# Patient Record
Sex: FEMALE | Race: ASIAN | URBAN - METROPOLITAN AREA
[De-identification: names, ages, dates, MRNs, and addresses within clinical notes are randomized per-mention and may not be internally consistent; named-entity substitution may affect disease eponyms.]

---

## 2018-04-30 RX ORDER — FLUTICASONE PROPIONATE 50 MCG
1 SPRAY, SUSPENSION (ML) NASAL DAILY
COMMUNITY

## 2018-05-02 ENCOUNTER — ANESTHESIA (OUTPATIENT)
Dept: SURGERY | Facility: CLINIC | Age: 58
End: 2018-05-02
Payer: COMMERCIAL

## 2018-05-02 ENCOUNTER — ANESTHESIA EVENT (OUTPATIENT)
Dept: SURGERY | Facility: CLINIC | Age: 58
End: 2018-05-02
Payer: COMMERCIAL

## 2018-05-02 ENCOUNTER — HOSPITAL ENCOUNTER (OUTPATIENT)
Facility: CLINIC | Age: 58
Discharge: HOME | End: 2018-05-02
Attending: OPHTHALMOLOGY | Admitting: OPHTHALMOLOGY
Payer: COMMERCIAL

## 2018-05-02 VITALS
WEIGHT: 109 LBS | OXYGEN SATURATION: 99 % | SYSTOLIC BLOOD PRESSURE: 130 MMHG | BODY MASS INDEX: 20.58 KG/M2 | RESPIRATION RATE: 20 BRPM | DIASTOLIC BLOOD PRESSURE: 80 MMHG | HEIGHT: 61 IN

## 2018-05-02 PROCEDURE — 25000125 ZZHC RX 250: Performed by: ANESTHESIOLOGY

## 2018-05-02 PROCEDURE — 36000105 ZZH EYE SURGERY LEVEL 4 EA 15 ADDTL MIN: Performed by: OPHTHALMOLOGY

## 2018-05-02 PROCEDURE — 27210794 ZZH OR GENERAL SUPPLY STERILE: Performed by: OPHTHALMOLOGY

## 2018-05-02 PROCEDURE — 40000170 ZZH STATISTIC PRE-PROCEDURE ASSESSMENT II: Performed by: OPHTHALMOLOGY

## 2018-05-02 PROCEDURE — 25000125 ZZHC RX 250: Performed by: OPHTHALMOLOGY

## 2018-05-02 PROCEDURE — 25000128 H RX IP 250 OP 636: Performed by: OPHTHALMOLOGY

## 2018-05-02 PROCEDURE — 25000132 ZZH RX MED GY IP 250 OP 250 PS 637: Performed by: OPHTHALMOLOGY

## 2018-05-02 PROCEDURE — 25000128 H RX IP 250 OP 636: Performed by: NURSE ANESTHETIST, CERTIFIED REGISTERED

## 2018-05-02 PROCEDURE — 37000008 ZZH ANESTHESIA TECHNICAL FEE, 1ST 30 MIN: Performed by: OPHTHALMOLOGY

## 2018-05-02 PROCEDURE — 36000104 ZZH EYE SURGERY LEVEL 4 1ST 30 MIN: Performed by: OPHTHALMOLOGY

## 2018-05-02 PROCEDURE — 37000009 ZZH ANESTHESIA TECHNICAL FEE, EACH ADDTL 15 MIN: Performed by: OPHTHALMOLOGY

## 2018-05-02 PROCEDURE — 27210995 ZZH RX 272: Performed by: OPHTHALMOLOGY

## 2018-05-02 PROCEDURE — 71000028 ZZH EYE RECOVERY PHASE 2 EACH 15 MINS: Performed by: OPHTHALMOLOGY

## 2018-05-02 PROCEDURE — 25000128 H RX IP 250 OP 636: Performed by: ANESTHESIOLOGY

## 2018-05-02 RX ORDER — SACCHAROMYCES BOULARDII 250 MG
250 CAPSULE ORAL 2 TIMES DAILY
COMMUNITY

## 2018-05-02 RX ORDER — CYCLOPENTOLATE HYDROCHLORIDE 10 MG/ML
1 SOLUTION/ DROPS OPHTHALMIC
Status: COMPLETED | OUTPATIENT
Start: 2018-05-02 | End: 2018-05-02

## 2018-05-02 RX ORDER — DEXAMETHASONE SODIUM PHOSPHATE 10 MG/ML
INJECTION, SOLUTION INTRAMUSCULAR; INTRAVENOUS PRN
Status: DISCONTINUED | OUTPATIENT
Start: 2018-05-02 | End: 2018-05-02 | Stop reason: HOSPADM

## 2018-05-02 RX ORDER — PHENYLEPHRINE HYDROCHLORIDE 25 MG/ML
1 SOLUTION/ DROPS OPHTHALMIC
Status: COMPLETED | OUTPATIENT
Start: 2018-05-02 | End: 2018-05-02

## 2018-05-02 RX ORDER — SODIUM CHLORIDE, SODIUM LACTATE, POTASSIUM CHLORIDE, CALCIUM CHLORIDE 600; 310; 30; 20 MG/100ML; MG/100ML; MG/100ML; MG/100ML
INJECTION, SOLUTION INTRAVENOUS CONTINUOUS
Status: DISCONTINUED | OUTPATIENT
Start: 2018-05-02 | End: 2018-05-02 | Stop reason: HOSPADM

## 2018-05-02 RX ORDER — PROPOFOL 10 MG/ML
INJECTION, EMULSION INTRAVENOUS PRN
Status: DISCONTINUED | OUTPATIENT
Start: 2018-05-02 | End: 2018-05-02

## 2018-05-02 RX ORDER — ATROPINE SULFATE 10 MG/ML
SOLUTION/ DROPS OPHTHALMIC PRN
Status: DISCONTINUED | OUTPATIENT
Start: 2018-05-02 | End: 2018-05-02 | Stop reason: HOSPADM

## 2018-05-02 RX ORDER — FENTANYL CITRATE 50 UG/ML
INJECTION, SOLUTION INTRAMUSCULAR; INTRAVENOUS PRN
Status: DISCONTINUED | OUTPATIENT
Start: 2018-05-02 | End: 2018-05-02

## 2018-05-02 RX ORDER — BALANCED SALT SOLUTION 6.4; .75; .48; .3; 3.9; 1.7 MG/ML; MG/ML; MG/ML; MG/ML; MG/ML; MG/ML
SOLUTION OPHTHALMIC PRN
Status: DISCONTINUED | OUTPATIENT
Start: 2018-05-02 | End: 2018-05-02 | Stop reason: HOSPADM

## 2018-05-02 RX ORDER — ONDANSETRON 2 MG/ML
INJECTION INTRAMUSCULAR; INTRAVENOUS PRN
Status: DISCONTINUED | OUTPATIENT
Start: 2018-05-02 | End: 2018-05-02

## 2018-05-02 RX ADMIN — FENTANYL CITRATE 50 MCG: 50 INJECTION, SOLUTION INTRAMUSCULAR; INTRAVENOUS at 10:49

## 2018-05-02 RX ADMIN — PROPOFOL 30 MG: 10 INJECTION, EMULSION INTRAVENOUS at 10:49

## 2018-05-02 RX ADMIN — ONDANSETRON 4 MG: 2 INJECTION INTRAMUSCULAR; INTRAVENOUS at 10:54

## 2018-05-02 RX ADMIN — CYCLOPENTOLATE HYDROCHLORIDE 1 DROP: 10 SOLUTION/ DROPS OPHTHALMIC at 09:28

## 2018-05-02 RX ADMIN — LIDOCAINE HYDROCHLORIDE 1 ML: 10 INJECTION, SOLUTION EPIDURAL; INFILTRATION; INTRACAUDAL; PERINEURAL at 09:34

## 2018-05-02 RX ADMIN — CYCLOPENTOLATE HYDROCHLORIDE 1 DROP: 10 SOLUTION/ DROPS OPHTHALMIC at 09:33

## 2018-05-02 RX ADMIN — PHENYLEPHRINE HYDROCHLORIDE 1 DROP: 2.5 SOLUTION/ DROPS OPHTHALMIC at 09:33

## 2018-05-02 RX ADMIN — PHENYLEPHRINE HYDROCHLORIDE 1 DROP: 2.5 SOLUTION/ DROPS OPHTHALMIC at 09:28

## 2018-05-02 RX ADMIN — PHENYLEPHRINE HYDROCHLORIDE 1 DROP: 2.5 SOLUTION/ DROPS OPHTHALMIC at 09:21

## 2018-05-02 RX ADMIN — MIDAZOLAM 1 MG: 1 INJECTION INTRAMUSCULAR; INTRAVENOUS at 10:49

## 2018-05-02 RX ADMIN — CYCLOPENTOLATE HYDROCHLORIDE 1 DROP: 10 SOLUTION/ DROPS OPHTHALMIC at 09:21

## 2018-05-02 RX ADMIN — SODIUM CHLORIDE, POTASSIUM CHLORIDE, SODIUM LACTATE AND CALCIUM CHLORIDE: 600; 310; 30; 20 INJECTION, SOLUTION INTRAVENOUS at 09:33

## 2018-05-02 ASSESSMENT — ENCOUNTER SYMPTOMS: SEIZURES: 0

## 2018-05-02 ASSESSMENT — COPD QUESTIONNAIRES: COPD: 0

## 2018-05-02 NOTE — IP AVS SNAPSHOT
MRN:[id number]                      After Visit Summary   5/2/2018    Shannen Leavitt    MRN: [id number]           Thank you!     Thank you for choosing [facility] for your care. Our goal is always to provide you with excellent care. Hearing back from our patients is one way we can continue to improve our services. Please take a few minutes to complete the written survey that you may receive in the mail after you visit with us. Thank you!        Patient Information     Date Of Birth          1960        About your hospital stay     You were admitted on:  May 2, 2018 You last received care in the:  [facility]    You were discharged on:  May 2, 2018       Who to Call     For medical emergencies, please call 911.  For non-urgent questions about your medical care, please call your primary care provider or clinic, [phone]  For questions related to your surgery, please call your surgery clinic        Attending Provider     Provider Shamar Hedrick MD Surgery       Primary Care Provider Office Phone # Fax #    Ramon Sutherland [phone]      After Care Instructions     Activity       Avoid strenuous activities the next several days.                  Further instructions from your care team        INSTRUCTIONS FOLLOWING SURGERY  DR. BOWEN, DR. FINE, DR. CASTILLO, DR. MASCORRO, DR. FREGOSO    Will I have pain?  Some discomfort is normal and expected following surgery.  The first few days after surgery you may need to use prescription pain pills.  Taking Advil (Ibuprofen) regularly may help prevent pain.    Discomfort should gradually decrease and Tylenol or Advil should be sufficient to relieve pain. A foreign body sensation in the cornea of the eye is very common and caused by sutures placed at surgery. These sutures will go away in one to two weeks. If the pain worsens, you should call the doctor.     Do I need to wear an eye  patch?  You do not need to wear an eye patch at home after the doctor has removed the patch on your first day after surgery.  However, you may be more comfortable wearing a patch outside in the sun, when sleeping or napping, or in a rachel, windy environment.     How much drainage should I have?  You may expect a moderate amount of drainage for a week.  Gradually the drainage should decrease.  The lids can be cleaned with a clean washcloth and gentle soap or diluted baby shampoo.  Wipe the eyelids gently from the nose outward. Some blood in the tears is a normal finding.    Will there be swelling?  Some swelling is normal for about a week or two after which it will gradually decrease.  Applying a cool compress, using a clean washcloth, for 5 - 10 minutes several times a day may reduce the swelling and make you more comfortable.  People may have some swelling of both eyes, especially if face down positioning is required. The white part of the eye may appear very red or bloody for a week or two. This may get worse a few days after surgery. Though the bright red appearance can look frightening, it is a normal finding early after surgery and will resolve in a few weeks.    Will I need to use eye drops?  You will be using several different kinds of eye drops or ointment (salve) when you leave the hospital.  The directions will be on each bottle. The medication with the red top will keep your eye dilated and may make your eye more sensitive to light. Wearing sunglasses may help. The other medication is a combination antibiotics/steroid to prevent infection and promote healing.  Occasionally a third drop is used to control the pressure in your eye. A new bottle of artificial tears or lubricant ointment may be used along with your prescription eye drops after surgery. You will be using drops from four to eight weeks. Bring all eye medications (drops, ointments, or pills) with you to each visit.    Always wash your hands  before putting in the eye drops.  You may wish to have someone else help you.  Pull down on the lower lid and squeeze one drop from the bottle being careful not to touch the dropper to your eye or eyelid.  One drop is sufficient, but another may be used if the first did not go into the eye.  It is often easier to put in the drops if you are reclining or lying down.  Wait five (5) minutes after the first drop before using the second drop to allow the medications to absorb into the eye.        How long will it take for my vision to improve?  Your vision should gradually improve, but it may take up to six months to regain your best vision.  Frequently, air or gas bubbles are injected into the eye at the time of surgery.  This will blur your vision significantly at first.  As the bubble becomes smaller it will cause a black line in your vision that moves as you move your head.  As the bubble becomes smaller you may notice that it looks more like a bubble or that it will break up into several smaller bubbles. It will take from a few days to a few weeks for the bubble to dissolve and be replaced by clear fluid.     You may notice floaters or double vision after your surgery.  These symptoms usually will decrease with time.  If the double vision is bothersome patching the eye may help.    If you notice a sudden worsening in your vision call your doctor.    Are there any physical restrictions after surgery?  Do not lay on back   As the eye heals and the bubble dissolves there will be less of a need for you to stay in that specific position.  You should avoid sleeping on your back until the bubble has totally dissolved and you have been given permission from your surgeon. You should not fly in an airplane or go in high altitudes in the mountains while there is a bubble in your eye. If you should require any other surgery, under general anesthesia, while you still have an air bubble, have your surgeon or anesthetist contact  us prior to your surgery. Some anesthetic agents can make the bubble expand and seriously damage your eye.  You will have a green medical alert band placed on your wrist for this reason; this should not be removed until the doctor gives you permission or removes it for you.    Heavy lifting (greater than 50 pounds), swimming and contact sports should be avoided for about 3 to 4 weeks after surgery.    You may resume your usual sexual activities about one week after surgery.    When may I return to work or my normal activities?  Depending on the type of work, you may return to work within a few days.  If your work involves physical activity or driving, you will need to restrict your activities and remain home longer.    You may watch TV, look at magazines, or work puzzles.  Reading may be uncomfortable for several days, but using the eyes will not cause any damage.    You may go outside as usual.  If conditions are windy or rachel wear an eye pad to avoid getting dust or dirt in the eye.    Can I travel?  You cannot fly in an airplane or drive into the mountains as long as the air or gas bubble remains in your eye.    Are there any driving restrictions?  Someone will need to drive you home from the hospital.  Generally driving can be resumed in several days if you have good vision in your other eye.  If you do not feel comfortable driving, do not drive!  Your depth perception will be decreased so you will want to try driving during the day in light traffic until you feel comfortable driving.  You should restrict your driving while you are taking prescription pain pills as they also can affect your judgment.    When can I shower and wash my hair?  You may shower or bathe when you get home, but avoid getting water in your eye.  You may want someone to help you shampoo your hair at first.      You may shave, brush your teeth, or comb your hair.  Do not use make-up, mascara, or creams/lotions around your eyes for several  weeks     When will I see the doctor again?  Generally you will be seen the first day after surgery and again 1-2 weeks later.  If you have not received a return appointment before leaving the hospital, you should call our office during the business hours to arrange an appointment. If you will be seeing your local doctor instead of us, you will need to call that office to set up an appointment.    How do I reach a doctor if I have concerns?  One of our doctors is available by calling [phone] in the [facility], [phone] in [place], or [phone] from outside the area.  After normal office hours the answering service will put you in touch with the doctor on call. The doctors take call from home but are available for a retinal emergency. Please try to call for routine questions and prescription refills during business hours.    You should call your doctor if:      You notice a sudden decrease in your vision.      Have severe pain or pain increases rather than subsiding.      You notice a new black curtain over your eye that is not the gas bubble.    If you have any of these symptoms, you may need to be examined.    [facility] Anesthesia [facility] Discharge  Instructions  Anesthesia (Eye Care Center)   Adult Discharge Instructions    For 24 hours after surgery    1. Get plenty of rest.  Make arrangements to have a responsible adult stay with you for at least 6 hours after you leave the hospital.  2. Do not drive or use heavy equipment for 24 hours.    3. Do not drink alcohol for 24 hours.  4. Do not sign legal documents or make important decisions for 24 hours.  5. Avoid strenuous or risky activities. You may feel lightheaded.  If so, sit for a few minutes before standing.  Have someone help you get up.   6. Conscious sedation patients may resume a regular diet..  7. Any questions of medical nature, call your physician.    Pending Results     No orders found from 4/30/2018 to  "5/3/2018.            Admission Information     Date & Time Provider Department Dept. Phone    2018 Shamar Cooley MD [facility] [phone]      Your Vitals Were     Blood Pressure Respirations Height Weight Pulse Oximetry BMI (Body Mass Index)    131/86 20 1.549 m (5' 1\") 49.4 kg (109 lb) 100% 20.6 kg/m2      MyChart Information     [email or web address] lets you send messages to your doctor, view your test results, renew your prescriptions, schedule appointments and more. To sign up, go to [email or web address] . Click on \"Log in\" on the left side of the screen, which will take you to the Welcome page. Then click on \"Sign up Now\" on the right side of the page.     You will be asked to enter the access code listed below, as well as some personal information. Please follow the directions to create your username and password.     Your access code is: [email or web address]  Expires: 2018 11:33 AM     Your access code will  in 90 days. If you need help or a new code, please call your [facility] clinic or [phone].        Care EveryWhere ID     This is your Care EveryWhere ID. This could be used by other organizations to access your [facility] medical records  [id number]        Equal Access to Services     ANA GAUTAM AH: Hadii winston hyatt hadasho Sogarretali, waaxda luqadaha, qaybta kaalmada adelolada, bren carter. So [facility] [phone].    ATENCIÓN: Si habla español, tiene a downs disposición servicios gratuitos de asistencia lingüística. Chen al [phone].    We comply with applicable federal civil rights laws and [place] laws. We do not discriminate on the basis of race, color, national origin, age, disability, sex, sexual orientation, or gender identity.               Review of your medicines      UNREVIEWED medicines. Ask your doctor about these medicines        Dose / Directions    ASPIRIN PO        Dose:  81 mg   Take 81 mg by mouth twice a week   Refills:  0       " CALCIUM PO        Take by mouth daily   Refills:  0       fluticasone 50 MCG/ACT spray   Commonly known as:  FLONASE        Dose:  1 spray   Spray 1 spray into both nostrils daily   Refills:  0       MULTIVITAMIN ADULTS PO        Take by mouth daily   Refills:  0       OMEGA 3 PO        Take by mouth daily   Refills:  0       saccharomyces boulardii 250 MG capsule   Commonly known as:  FLORASTOR        Dose:  250 mg   Take 250 mg by mouth 2 times daily   Refills:  0       VITAMIN D (CHOLECALCIFEROL) PO        Take by mouth daily   Refills:  0                Protect others around you: Learn how to safely use, store and throw away your medicines at www.disposemymeds.org.             Medication List: This is a list of all your medications and when to take them. Check marks below indicate your daily home schedule. Keep this list as a reference.      Medications           Morning Afternoon Evening Bedtime As Needed    ASPIRIN PO   Take 81 mg by mouth twice a week                                CALCIUM PO   Take by mouth daily                                fluticasone 50 MCG/ACT spray   Commonly known as:  FLONASE   Spray 1 spray into both nostrils daily                                MULTIVITAMIN ADULTS PO   Take by mouth daily                                OMEGA 3 PO   Take by mouth daily                                saccharomyces boulardii 250 MG capsule   Commonly known as:  FLORASTOR   Take 250 mg by mouth 2 times daily                                VITAMIN D (CHOLECALCIFEROL) PO   Take by mouth daily

## 2018-05-02 NOTE — ANESTHESIA CARE TRANSFER NOTE
Patient: Shannen Leavitt    Procedure(s):  RIGHT EYE VITRECTOMY PARSPLANA WITH 25 GAUGE SYSTEM, MEMBRANE PEEL,AIR FLUID EXCHANGE,ENDO LASER - Wound Class: I-Clean    Diagnosis: EPIRETINAL MEMBRANE  Diagnosis Additional Information: No value filed.    Anesthesia Type:   MAC     Note:  Airway :Room Air  Patient transferred to:Phase II  Comments: Level of Conscious:Awake  Vital Signs   BP:131/86   HR:48   RR:16   O2 Saturation:100   Oxygen LPM:Room Air  Patient Status:Stable  Report to PACU RN.      Vitals: (Last set prior to Anesthesia Care Transfer)    CRNA VITALS  5/2/2018 1051 - 5/2/2018 1121      5/2/2018             Pulse: (!)  49    Ht Rate: (!)  49    SpO2: 100 %    Resp Rate (set): 10                Electronically Signed By: Christine Marie Volp Hodgkins, CRNA, APRN CRNA  May 2, 2018  11:21 AM

## 2018-05-02 NOTE — OP NOTE
Procedure Date: 05/02/2018      DATE OF SURGERY:  05/02/2018.      PREOPERATIVE DIAGNOSES:  Epiretinal membrane, right eye.        POSTOPERATIVE DIAGNOSES:  Epiretinal membrane, right eye.      PROCEDURE:  25 gauge pars plana vitrectomy, membrane peel, endolaser, air fluid exchange, right eye.      SURGEON:  Shamar Cooley MD.      ASSISTANT:  RANDELL Correa.      ANESTHESIA:  Local with monitored anesthesia care.      ESTIMATED BLOOD LOSS:  Minimum.      SPECIMENS:  None.      COMPLICATIONS:  None.      INDICATIONS:  This patient presented with decreased vision in the right eye due to prominent epiretinal membrane.  Surgical intervention was offered and the patient decided to proceed after the risks, benefits and alternatives were explained.  Signed and witnessed informed consent was obtained.      DESCRIPTION OF PROCEDURE:  The patient was given a retrobulbar block in the preoperative holding area.  The patient was taken to the operating room and placed in the supine position.  The right eye was prepped and draped in the usual sterile fashion for ophthalmic surgery and a lid speculum was placed.  The eye was opened for a standard 25 gauge pars plana vitrectomy.  The eye was entered with a light pipe and vitrectomy probe and the BIOM was positioned.  A thorough vitrectomy was performed.  Of note, the eye had a preexisting posterior vitreous separation.  Attention was turned to the posterior pole.  A magnifying contact lens was placed on the cornea.  Dilute triamcinolone was injected over the macular surface.  The epiretinal membrane and underlying internal limiting membrane were peeled from arcade to arcade with 25 gauge forceps.  The BIOM was then repositioned.  The retinal periphery was inspected with scleral depression.  There was an area of lattice degeneration present at 6 o'clock, and this was treated with endolaser.  Peripheral exam otherwise did not show any evidence of retinal detachment or tear.  A  subtotal air fluid exchange was performed with a soft tipped active extrusion cannula.  All trocars were removed.  The eye was left at physiologic pressure.  The sclerotomies were airtight.  Subconjunctival injections of Ancef and dexamethasone were placed.  Maxitrol and atropine were applied.  A sterile eye pad and shield were taped into position.  The patient was taken to the recovery area in stable condition after tolerating surgery well.  She will follow up in 1 day.         WANDA FINE MD             D: 2018   T: 2018   MT: DE      Name:     YVETTE LOWERY   MRN:      [id number]        Account:        [id number]   :      1960           Procedure Date: 2018      Document: [id number]

## 2018-05-02 NOTE — ANESTHESIA POSTPROCEDURE EVALUATION
Patient: Shannen Leavitt    Procedure(s):  RIGHT EYE VITRECTOMY PARSPLANA WITH 25 GAUGE SYSTEM, MEMBRANE PEEL,AIR FLUID EXCHANGE,ENDO LASER - Wound Class: I-Clean    Diagnosis:EPIRETINAL MEMBRANE  Diagnosis Additional Information: No value filed.    Anesthesia Type:  MAC    Note:  Anesthesia Post Evaluation    Patient location during evaluation: PACU  Patient participation: Able to fully participate in evaluation  Level of consciousness: awake and awake and alert  Pain management: adequate  Airway patency: patent  Cardiovascular status: acceptable  Respiratory status: acceptable  Hydration status: acceptable  PONV: none     Anesthetic complications: None          Last vitals:  Vitals:    05/02/18 1125 05/02/18 1145   BP: 131/86 130/80   Resp: 20 20   SpO2: 100% 99%         Electronically Signed By: Lina Shaikh  May 2, 2018  12:57 PM

## 2018-05-02 NOTE — DISCHARGE INSTRUCTIONS
INSTRUCTIONS FOLLOWING SURGERY  DR. BOWEN, DR. FINE, DR. CASTILLO, DR. MASCORRO, DR. FREGOSO    Will I have pain?  Some discomfort is normal and expected following surgery.  The first few days after surgery you may need to use prescription pain pills.  Taking Advil (Ibuprofen) regularly may help prevent pain.    Discomfort should gradually decrease and Tylenol or Advil should be sufficient to relieve pain. A foreign body sensation in the cornea of the eye is very common and caused by sutures placed at surgery. These sutures will go away in one to two weeks. If the pain worsens, you should call the doctor.     Do I need to wear an eye patch?  You do not need to wear an eye patch at home after the doctor has removed the patch on your first day after surgery.  However, you may be more comfortable wearing a patch outside in the sun, when sleeping or napping, or in a rachel, windy environment.     How much drainage should I have?  You may expect a moderate amount of drainage for a week.  Gradually the drainage should decrease.  The lids can be cleaned with a clean washcloth and gentle soap or diluted baby shampoo.  Wipe the eyelids gently from the nose outward. Some blood in the tears is a normal finding.    Will there be swelling?  Some swelling is normal for about a week or two after which it will gradually decrease.  Applying a cool compress, using a clean washcloth, for 5 - 10 minutes several times a day may reduce the swelling and make you more comfortable.  People may have some swelling of both eyes, especially if face down positioning is required. The white part of the eye may appear very red or bloody for a week or two. This may get worse a few days after surgery. Though the bright red appearance can look frightening, it is a normal finding early after surgery and will resolve in a few weeks.    Will I need to use eye drops?  You will be using several different kinds of eye drops or ointment (salve) when you  leave the hospital.  The directions will be on each bottle. The medication with the red top will keep your eye dilated and may make your eye more sensitive to light. Wearing sunglasses may help. The other medication is a combination antibiotics/steroid to prevent infection and promote healing.  Occasionally a third drop is used to control the pressure in your eye. A new bottle of artificial tears or lubricant ointment may be used along with your prescription eye drops after surgery. You will be using drops from four to eight weeks. Bring all eye medications (drops, ointments, or pills) with you to each visit.    Always wash your hands before putting in the eye drops.  You may wish to have someone else help you.  Pull down on the lower lid and squeeze one drop from the bottle being careful not to touch the dropper to your eye or eyelid.  One drop is sufficient, but another may be used if the first did not go into the eye.  It is often easier to put in the drops if you are reclining or lying down.  Wait five (5) minutes after the first drop before using the second drop to allow the medications to absorb into the eye.        How long will it take for my vision to improve?  Your vision should gradually improve, but it may take up to six months to regain your best vision.  Frequently, air or gas bubbles are injected into the eye at the time of surgery.  This will blur your vision significantly at first.  As the bubble becomes smaller it will cause a black line in your vision that moves as you move your head.  As the bubble becomes smaller you may notice that it looks more like a bubble or that it will break up into several smaller bubbles. It will take from a few days to a few weeks for the bubble to dissolve and be replaced by clear fluid.     You may notice floaters or double vision after your surgery.  These symptoms usually will decrease with time.  If the double vision is bothersome patching the eye may help.    If  you notice a sudden worsening in your vision call your doctor.    Are there any physical restrictions after surgery?  Do not lay on back   As the eye heals and the bubble dissolves there will be less of a need for you to stay in that specific position.  You should avoid sleeping on your back until the bubble has totally dissolved and you have been given permission from your surgeon. You should not fly in an airplane or go in high altitudes in the mountains while there is a bubble in your eye. If you should require any other surgery, under general anesthesia, while you still have an air bubble, have your surgeon or anesthetist contact us prior to your surgery. Some anesthetic agents can make the bubble expand and seriously damage your eye.  You will have a green medical alert band placed on your wrist for this reason; this should not be removed until the doctor gives you permission or removes it for you.    Heavy lifting (greater than 50 pounds), swimming and contact sports should be avoided for about 3 to 4 weeks after surgery.    You may resume your usual sexual activities about one week after surgery.    When may I return to work or my normal activities?  Depending on the type of work, you may return to work within a few days.  If your work involves physical activity or driving, you will need to restrict your activities and remain home longer.    You may watch TV, look at magazines, or work puzzles.  Reading may be uncomfortable for several days, but using the eyes will not cause any damage.    You may go outside as usual.  If conditions are windy or rachel wear an eye pad to avoid getting dust or dirt in the eye.    Can I travel?  You cannot fly in an airplane or drive into the mountains as long as the air or gas bubble remains in your eye.    Are there any driving restrictions?  Someone will need to drive you home from the hospital.  Generally driving can be resumed in several days if you have good vision in your  other eye.  If you do not feel comfortable driving, do not drive!  Your depth perception will be decreased so you will want to try driving during the day in light traffic until you feel comfortable driving.  You should restrict your driving while you are taking prescription pain pills as they also can affect your judgment.    When can I shower and wash my hair?  You may shower or bathe when you get home, but avoid getting water in your eye.  You may want someone to help you shampoo your hair at first.      You may shave, brush your teeth, or comb your hair.  Do not use make-up, mascara, or creams/lotions around your eyes for several weeks     When will I see the doctor again?  Generally you will be seen the first day after surgery and again 1-2 weeks later.  If you have not received a return appointment before leaving the hospital, you should call our office during the business hours to arrange an appointment. If you will be seeing your local doctor instead of us, you will need to call that office to set up an appointment.    How do I reach a doctor if I have concerns?  One of our doctors is available by calling [phone] in the [facility], [phone] in [place], or [phone] from outside the area.  After normal office hours the answering service will put you in touch with the doctor on call. The doctors take call from home but are available for a retinal emergency. Please try to call for routine questions and prescription refills during business hours.    You should call your doctor if:      You notice a sudden decrease in your vision.      Have severe pain or pain increases rather than subsiding.      You notice a new black curtain over your eye that is not the gas bubble.    If you have any of these symptoms, you may need to be examined.    [facility] Discharge  Instructions  Anesthesia (Eye Care Center)   Adult Discharge Instructions    For 24 hours after  surgery    1. Get plenty of rest.  Make arrangements to have a responsible adult stay with you for at least 6 hours after you leave the hospital.  2. Do not drive or use heavy equipment for 24 hours.    3. Do not drink alcohol for 24 hours.  4. Do not sign legal documents or make important decisions for 24 hours.  5. Avoid strenuous or risky activities. You may feel lightheaded.  If so, sit for a few minutes before standing.  Have someone help you get up.   6. Conscious sedation patients may resume a regular diet..  7. Any questions of medical nature, call your physician.

## 2018-05-02 NOTE — ANESTHESIA PREPROCEDURE EVALUATION
Procedure: Procedure(s):  VITRECTOMY PARSPLANA WITH 25 GAUGE SYSTEM  Preop diagnosis: EPIRETINAL MEMBRANE    No Known Allergies  No past medical history on file.  Past Surgical History:   Procedure Laterality Date     BIOPSY      breast     COLONOSCOPY       HEAD & NECK SURGERY      teeth extraction     Social History   Substance Use Topics     Smoking status: Never Smoker     Smokeless tobacco: Never Used     Alcohol use Not on file     Prior to Admission medications    Medication Sig Start Date End Date Taking? Authorizing Provider   ASPIRIN PO Take 81 mg by mouth twice a week   Yes Reported, Patient   CALCIUM PO Take by mouth daily   Yes Reported, Patient   fluticasone (FLONASE) 50 MCG/ACT spray Spray 1 spray into both nostrils daily   Yes Reported, Patient   Multiple Vitamins-Minerals (MULTIVITAMIN ADULTS PO) Take by mouth daily   Yes Reported, Patient   Omega-3 Fatty Acids (OMEGA 3 PO) Take by mouth daily   Yes Reported, Patient   VITAMIN D, CHOLECALCIFEROL, PO Take by mouth daily   Yes Reported, Patient     Current Facility-Administered Medications Ordered in Epic   Medication Dose Route Frequency Last Rate Last Dose     bupivacaine 0.75% (pf) 4.5mL + lidocaine 2% (pf) 4.5mL + hyaluronidase (HYLENEX) 150 units   Retrobulbar Once         cyclopentolate (CYCLOGYL) 1 % ophthalmic solution 1 drop  1 drop Ophthalmic q5 Min Prior to Surgery   1 drop at 05/02/18 0921     lactated ringers infusion   Intravenous Continuous         lidocaine 1 % 1 mL  1 mL Other Q1H PRN         phenylephrine (MYDFRIN /DAVID-SYNEPHRINE) 2.5 % ophthalmic solution 1 drop  1 drop Ophthalmic q5 Min Prior to Surgery   1 drop at 05/02/18 0921     povidone-iodine 5 % ophthalmic solution   Ophthalmic Once         No current [facility]-ordered outpatient prescriptions on file.       lactated ringers       Wt Readings from Last 1 Encounters:   04/30/18 49.4 kg (109 lb)     Temp Readings from Last 1 Encounters:   No data found for Temp     BP Readings from  Last 6 Encounters:   No data found for BP     Pulse Readings from Last 4 Encounters:   No data found for Pulse     Resp Readings from Last 1 Encounters:   No data found for Resp     SpO2 Readings from Last 1 Encounters:   No data found for SpO2         Anesthesia Evaluation     .             ROS/MED HX    ENT/Pulmonary:     (+)allergic rhinitis, , . .   (-) asthma, COPD and sleep apnea   Neurologic:      (-) seizures and CVA   Cardiovascular:        (-) dyslipidemia   METS/Exercise Tolerance:  >4 METS   Hematologic:         Musculoskeletal:         GI/Hepatic:        (-) GERD and liver disease   Renal/Genitourinary:      (-) renal disease   Endo:         Psychiatric:         Infectious Disease:         Malignancy:         Other:                     Physical Exam  Normal systems: dental    Airway   Mallampati: I  TM distance: >3 FB  Neck ROM: full    Dental     Cardiovascular   Rhythm and rate: regular      Pulmonary    breath sounds clear to auscultation                    Anesthesia Plan      History & Physical Review  History and physical reviewed and following examination; no interval change.    ASA Status:  1 .    NPO Status:  > 8 hours    Plan for MAC Reason for MAC:  Procedure to face, neck, head or breast         Postoperative Care      Consents  Anesthetic plan, risks, benefits and alternatives discussed with:  Patient..                          .

## 2018-05-02 NOTE — IP AVS SNAPSHOT
[facility]    [address]    [place] [address]    Phone:  [phone]    Fax:  [phone]                                       After Visit Summary   5/2/2018    Shannen Leavitt    MRN: [id number]           After Visit Summary Signature Page     I have received my discharge instructions, and my questions have been answered. I have discussed any challenges I see with this plan with the nurse or doctor.    ..........................................................................................................................................  Patient/Patient Representative Signature      ..........................................................................................................................................  Patient Representative Print Name and Relationship to Patient    ..................................................               ................................................  Date                                            Time    ..........................................................................................................................................  Reviewed by Signature/Title    ...................................................              ..............................................  Date                                                            Time

## 2018-05-02 NOTE — BRIEF OP NOTE
[facility] Brief Operative Note    Pre-operative diagnosis: EPIRETINAL MEMBRANE, right eye   Post-operative diagnosis EPIRETINAL MEMBRANE, right eye   Procedure: Procedure(s):  RIGHT EYE VITRECTOMY PARSPLANA WITH 25 GAUGE SYSTEM, MEMBRANE PEEL,AIR FLUID EXCHANGE,ENDO LASER - Wound Class: I-Clean   Surgeon(s): Surgeon(s) and Role:     * Shamar Cooley MD - Primary   Estimated blood loss: 0 mL    Specimens: * No specimens in log *   Findings: As above

## (undated) DEVICE — EYE NDL RETROBULBAR 25GA 1.5" 581275

## (undated) DEVICE — DRAPE MICROSCOPE DRAPE  EYE DI40001

## (undated) DEVICE — NDL 18GA 1.5" 305196

## (undated) DEVICE — EYE PACK 25GA PLUS CONSTELLATION PPK4253

## (undated) DEVICE — SYR 05ML SLIP TIP W/O NDL

## (undated) DEVICE — PACK VITRECTOMY PQ15VI57E

## (undated) DEVICE — GLOVE PROTEXIS MICRO 6.0  2D73PM60

## (undated) DEVICE — SYR 05ML SLIP TIP W/O NDL 309647

## (undated) DEVICE — EYE SOL BSS 15ML BOTTLE 65079515

## (undated) DEVICE — Device

## (undated) DEVICE — TAPE MICROPORE 1"X1.5YD 1530S-1

## (undated) DEVICE — GLOVE PROTEXIS MICRO 7.0  2D73PM70

## (undated) DEVICE — LINEN TOWEL PACK X5 5464

## (undated) DEVICE — EYE SOL BSS 500ML

## (undated) DEVICE — EYE PROBE LASER 25GA FLEX RFID 8065751114

## (undated) DEVICE — EYE CANN SOFT TIP 25GA FOR VALVED SET 8065149530

## (undated) DEVICE — SYR 01ML 25GA 5/8" TBC

## (undated) DEVICE — EYE SHIELD PLASTIC

## (undated) DEVICE — EYE LENS VITRECTOMY MAGNIFYING ODVM

## (undated) DEVICE — GLOVE PROTEXIS MICRO 7.5  2D73PM75

## (undated) RX ORDER — WATER 10 ML/10ML
INJECTION INTRAMUSCULAR; INTRAVENOUS; SUBCUTANEOUS
Status: DISPENSED
Start: 2018-05-02

## (undated) RX ORDER — ATROPINE SULFATE 10 MG/ML
SOLUTION/ DROPS OPHTHALMIC
Status: DISPENSED
Start: 2018-05-02

## (undated) RX ORDER — ONDANSETRON 2 MG/ML
INJECTION INTRAMUSCULAR; INTRAVENOUS
Status: DISPENSED
Start: 2018-05-02

## (undated) RX ORDER — TRIAMCINOLONE ACETONIDE 40 MG/ML
INJECTION, SUSPENSION INTRA-ARTICULAR; INTRAMUSCULAR
Status: DISPENSED
Start: 2018-05-02

## (undated) RX ORDER — FENTANYL CITRATE 50 UG/ML
INJECTION, SOLUTION INTRAMUSCULAR; INTRAVENOUS
Status: DISPENSED
Start: 2018-05-02

## (undated) RX ORDER — CEFAZOLIN SODIUM 500 MG/2.2ML
INJECTION, POWDER, FOR SOLUTION INTRAMUSCULAR; INTRAVENOUS
Status: DISPENSED
Start: 2018-05-02

## (undated) RX ORDER — DEXAMETHASONE SODIUM PHOSPHATE 10 MG/ML
INJECTION, SOLUTION INTRAMUSCULAR; INTRAVENOUS
Status: DISPENSED
Start: 2018-05-02